# Patient Record
Sex: FEMALE | Race: BLACK OR AFRICAN AMERICAN | NOT HISPANIC OR LATINO | URBAN - NONMETROPOLITAN AREA
[De-identification: names, ages, dates, MRNs, and addresses within clinical notes are randomized per-mention and may not be internally consistent; named-entity substitution may affect disease eponyms.]

---

## 2018-08-06 PROBLEM — H52.4: Noted: 2018-08-06

## 2018-08-06 PROBLEM — H52.13: Noted: 2018-08-06

## 2018-08-06 PROBLEM — H25.13: Noted: 2021-04-30

## 2018-08-06 PROBLEM — H52.223: Noted: 2018-08-06

## 2019-10-09 ENCOUNTER — IMPORTED ENCOUNTER (OUTPATIENT)
Dept: URBAN - NONMETROPOLITAN AREA CLINIC 1 | Facility: CLINIC | Age: 49
End: 2019-10-09

## 2019-10-09 PROCEDURE — 92310 CONTACT LENS FITTING OU: CPT

## 2019-10-09 PROCEDURE — 92015 DETERMINE REFRACTIVE STATE: CPT

## 2019-10-09 PROCEDURE — 92014 COMPRE OPH EXAM EST PT 1/>: CPT

## 2019-10-09 NOTE — PATIENT DISCUSSION
Myopia-Discussed diagnosis with patient. -Explained that people who are myopic are at a higher risk for developing RD/RT and reviewed associated S&S.-Pt to contact our office if symptoms develop. Continue current spec Rx. Rx given. Presbyopia-Discussed diagnosis with patient. Astigmatism-Discussed diagnosis with patient. CL wear-CLs fit and center well.-Stressed that patient should not sleep in CL. -Continue current CL Rx. Rx given. -CL care and precautions given.

## 2019-12-30 ENCOUNTER — IMPORTED ENCOUNTER (OUTPATIENT)
Dept: URBAN - NONMETROPOLITAN AREA CLINIC 1 | Facility: CLINIC | Age: 49
End: 2019-12-30

## 2020-04-24 ENCOUNTER — HOSPITAL ENCOUNTER (OUTPATIENT)
Dept: HOSPITAL 62 - RDC | Age: 50
End: 2020-04-24
Attending: NURSE PRACTITIONER
Payer: COMMERCIAL

## 2020-04-24 VITALS — SYSTOLIC BLOOD PRESSURE: 157 MMHG | DIASTOLIC BLOOD PRESSURE: 81 MMHG

## 2020-04-24 DIAGNOSIS — R09.89: ICD-10-CM

## 2020-04-24 DIAGNOSIS — F17.200: ICD-10-CM

## 2020-04-24 DIAGNOSIS — R51: ICD-10-CM

## 2020-04-24 DIAGNOSIS — Z20.828: Primary | ICD-10-CM

## 2020-04-24 DIAGNOSIS — I10: ICD-10-CM

## 2020-04-24 DIAGNOSIS — R05: ICD-10-CM

## 2020-04-24 DIAGNOSIS — J02.9: ICD-10-CM

## 2020-04-24 LAB
A TYPE INFLUENZA AG: NEGATIVE
B INFLUENZA AG: NEGATIVE

## 2020-04-24 PROCEDURE — 99211 OFF/OP EST MAY X REQ PHY/QHP: CPT

## 2020-04-24 PROCEDURE — 87070 CULTURE OTHR SPECIMN AEROBIC: CPT

## 2020-04-24 PROCEDURE — 87804 INFLUENZA ASSAY W/OPTIC: CPT

## 2020-04-24 PROCEDURE — 87880 STREP A ASSAY W/OPTIC: CPT

## 2020-04-24 PROCEDURE — 87635 SARS-COV-2 COVID-19 AMP PRB: CPT

## 2020-04-24 NOTE — ER RDC ASSESSMENT REPORT
Intake





- In the Last 14 days


Have you traveled outside [place]?: No


Have you been in close contact with someone CONFIRMED: No


Worked in Healthcare?: No





- Symptoms


Subjective Fever(Felt feverish): No


Chills: No


Muscule Aches: No


Runny Nose: Yes


Sore Throat: Yes


Cough (New or worsening chronic cough): Yes


Shortness of breath: No


Nausea or Vomiting: No


Headache: Yes


Abdominal Pain: No


Diarrhea(3 or more loose stools in last 24 hours): No





- Do you have any of the following


Chronic lung disease: Asthma or emphysema or COPD: No


Cystic Fibrosis: No


Diabetes: No


High Blood Pressure: Yes


Cardiovascular Disease: Yes


Chronic Kidney Disease: No


Chronic Liver Disease: No


Chronic blood disorder like Sickle Cell Disease: No


Neurologic condition that limits movement: No


Developmental delay - Moderate to Severe: No


Recent (within past 2 weeks) or current Pregnancy: No


Morbid Obesity (>100 pounds over ideal weight): No


Obesity Comment: 





Height 5 feet 4 inches weight 175 pounds





- Objective


Temperature: 96.3 F


Pulse Rate: 68


Respiratory Rate: 20


Blood Pressure: 157/81


O2 Sat by Pulse Oximetry: 100


Objective: 


Given above, testing performed: 
































If Testing Performed:


Test Specimen Type Sent to











General





- General


Information source: Patient


Notes: 





Patient here at [facility] for COVID testing.  Reports started feeling bad yesterday 

seems like seasonal allergies.  Reports get seasonal allergies around this time 

every year. Reports runny nose and dry cough, sore throat. 





Past Medical History





- General


Information source: Patient





- Social History


Smoking Status: Current Every Day Smoker - Smokes one pack  per day.  encouraged

to stop smoking.





Physical Exam





- General


General appearance: Appears well, Alert


In distress: None


Notes: 





PHYSICAL EXAMINATION: 


GENERAL: Well-appearing and in no acute distress. 


HEAD: Atraumatic, normocephalic. 


EYES: sclera anicteric, conjunctiva are normal. 


ENT: nares patent. Moist mucous membranes. 


NECK: Normal range of motion, supple without lymphadenopathy 


LUNGS: CTAB and equal. No wheezes rales or rhonchi. resp even and unlabored. 

Lung sounds clear. 


HEART: Regular rate and rhythm without murmurs 


ABDOMEN: Soft, nontender, normal bowel sounds, no guarding. 


EXTREMITIES: No cyanosis. 


NEUROLOGICAL: Normal speech. 


PSYCH: Normal mood, normal affect. 


SKIN: Warm, Dry, normal turgor,








Diagnostic Results


Laboratory Results: 


Patient informed of negative rapid strep and negative rapid flu results.  

Pending strep culture pending COVID testing results.  Patient provided 

instructions regarding COVID to include


; As a person under investigation for Covid 19, the [facility], division of public health advises you to adhere to 

the following guidance until your test results are reported to you.  If your 

test result is positive, you will receive additional information from your 

provider and your local health department at that time.


 


Remain at home until you are cleared by the health provider or public health 

authorities.


 


Keep a log of visitors to your home, notify any visitors to your home of your 

isolation status.


 


If you plan to move to a new address or leave the county, notify the local 

health department in your County.


 


Call your doctor or seek care if you have an urgent medical need.  Before 

seeking medical care, call ahead to get instructions from the provider before 

arriving at the medical office clinic or hospital.  Notify them that you are 

being tested for the virus that causes Covid 19 so that arrangements can be 

made, as necessary, to prevent transmission to others in the healthcare setting.

 Next, notify the local health department in your county.


 


If a medical emergency arises and you need to call 911, inform the first 

responders that you are being tested for the virus that causes Covid 19.  Next, 

notify the local health department in your county.





Patient Education/Counseling


Counseling/Education: 





Patient presents with upper respiratory symptoms worrisome for possible Covid 

19.  Patient does not have emergency worring symptoms such as difficulty 

breathing, shortness of breath, chest pain, pressure, confusion or cyanosis.  

Patient appears suitable for discharge. Patient to follow up with PCP at [facility]

oir today.  Instructed to go to ED with persistent or worsening symptoms.   

Patient's vital signs are stable and patient is nontoxic in appearance.  Good 

return precautions have been discussed with patient, patient verbalized 

understanding and is agreeable with discharge plan of care at this time.





RDC Discharge





- Discharge


Condition: Stable


Disposition: Home; Selfcare

## 2021-04-29 ENCOUNTER — IMPORTED ENCOUNTER (OUTPATIENT)
Dept: URBAN - NONMETROPOLITAN AREA CLINIC 1 | Facility: CLINIC | Age: 51
End: 2021-04-29

## 2021-04-29 PROCEDURE — 92015 DETERMINE REFRACTIVE STATE: CPT

## 2021-04-29 PROCEDURE — 92310 CONTACT LENS FITTING OU: CPT

## 2021-04-29 PROCEDURE — 92014 COMPRE OPH EXAM EST PT 1/>: CPT

## 2021-04-29 NOTE — PATIENT DISCUSSION
Myopia-Discussed diagnosis with patient. -Explained that people who are myopic are at a higher risk for developing RD/RT and reviewed associated S&S.-Pt to contact our office if symptoms develop. Continue current spec Rx. Rx given. Presbyopia-Discussed diagnosis with patient. Astigmatism-Discussed diagnosis with patient. CL wear-CLs fit and center well.-Stressed that patient should not sleep in CL. -Continue current CL Rx. Rx given. -CL care and precautions given. Cataract OU-Not yet surgical. -Reviewed symptoms of advancing cataract growth such as glare and halos and decreased vision.-Continue to monitor for now. Pt will notify us if any new symptoms develop.

## 2021-04-30 PROBLEM — H52.4: Noted: 2018-08-06

## 2021-04-30 PROBLEM — H25.13: Noted: 2021-04-30

## 2021-04-30 PROBLEM — H52.223: Noted: 2018-08-06

## 2021-04-30 PROBLEM — H52.13: Noted: 2018-08-06

## 2021-09-22 NOTE — PATIENT DISCUSSION
The types of intraocular lenses were reviewed with the patient along with a discussion of their various strengths and weaknesses. Patient would like to remain near sightedness as she has been her whole life. Pt elects basic goal -2.00.

## 2021-11-01 NOTE — PATIENT DISCUSSION
Cataract surgery has been performed in the first eye and activities of daily living are still impaired. The patient would like to proceed with cataract surgery in the second eye as scheduled. The patient elects BV OS, goal of -2.00.

## 2021-11-17 NOTE — PATIENT DISCUSSION
3 week PO: Patient is doing well post-operatively. The importance of post-op drop compliance was emphasized. Drop schedule reviewed with patient. Patient to call if any visual changes or concerns.

## 2022-04-10 ASSESSMENT — VISUAL ACUITY
OD_SC: 20/20-2
OS_SC: 20/20-
OS_SC: 20/20
OD_SC: 20/20

## 2022-04-10 ASSESSMENT — TONOMETRY
OD_IOP_MMHG: 15
OS_IOP_MMHG: 15
OS_IOP_MMHG: 15
OD_IOP_MMHG: 15

## 2024-10-02 ENCOUNTER — COMPREHENSIVE EXAM (OUTPATIENT)
Dept: RURAL CLINIC 3 | Facility: CLINIC | Age: 54
End: 2024-10-02

## 2024-10-02 DIAGNOSIS — H52.4: ICD-10-CM

## 2024-10-02 DIAGNOSIS — H52.13: ICD-10-CM

## 2024-10-02 DIAGNOSIS — H52.223: ICD-10-CM

## 2024-10-02 PROCEDURE — 92014 COMPRE OPH EXAM EST PT 1/>: CPT

## 2024-10-02 PROCEDURE — 92015 DETERMINE REFRACTIVE STATE: CPT

## 2024-10-02 PROCEDURE — 92310-E CONTACT LENS FITTING ESTABLISH PATIENT
